# Patient Record
Sex: FEMALE | Race: WHITE | NOT HISPANIC OR LATINO | URBAN - NONMETROPOLITAN AREA
[De-identification: names, ages, dates, MRNs, and addresses within clinical notes are randomized per-mention and may not be internally consistent; named-entity substitution may affect disease eponyms.]

---

## 2018-09-20 PROBLEM — H02.831: Noted: 2018-08-23

## 2018-09-20 PROBLEM — H52.223: Noted: 2018-08-23

## 2018-09-20 PROBLEM — H02.413: Noted: 2018-09-20

## 2018-09-20 PROBLEM — H52.03: Noted: 2018-08-23

## 2018-09-20 PROBLEM — H02.834: Noted: 2018-08-23

## 2019-01-17 ENCOUNTER — IMPORTED ENCOUNTER (OUTPATIENT)
Dept: URBAN - NONMETROPOLITAN AREA CLINIC 1 | Facility: CLINIC | Age: 59
End: 2019-01-17

## 2019-01-17 PROCEDURE — 92285 EXTERNAL OCULAR PHOTOGRAPHY: CPT

## 2019-01-17 NOTE — PATIENT DISCUSSION
Testing Only today. Ptosis/Bleph:-Ptosis (the upper eyelid being in a lower than normal position) of the upper eyelid was explained to the patient. -RBAs of ptosis repair discussed with patient. Treatment options include observation or surgical correction.-Due to affect of dermatochalasis on aesthetic outcome recommend pt have blepharoplasty during ptosis repair. -D/C all blood thinners 3 weeks prior to surgery if safe to do so. -Will order ptosis VF and external photos and review results with patient.-Task sent to Tracey1: 1BLF: 14 -lagTBUT: 13 secondsSchirmers OD:17                    OS:28

## 2019-05-18 ENCOUNTER — IMPORTED ENCOUNTER (OUTPATIENT)
Dept: URBAN - NONMETROPOLITAN AREA CLINIC 1 | Facility: CLINIC | Age: 59
End: 2019-05-18

## 2019-05-18 PROCEDURE — 67904 REPAIR EYELID DEFECT: CPT

## 2019-05-18 NOTE — PATIENT DISCUSSION
Bilateral Ptosis repair with skin excision Procedure done today e/o any complications post op instructions and kit given and reviewed with patient rtc in two weeks for suture removal and POV

## 2019-05-31 ENCOUNTER — IMPORTED ENCOUNTER (OUTPATIENT)
Dept: URBAN - NONMETROPOLITAN AREA CLINIC 1 | Facility: CLINIC | Age: 59
End: 2019-05-31

## 2019-05-31 PROCEDURE — 99024 POSTOP FOLLOW-UP VISIT: CPT

## 2021-09-22 ENCOUNTER — IMPORTED ENCOUNTER (OUTPATIENT)
Dept: URBAN - NONMETROPOLITAN AREA CLINIC 1 | Facility: CLINIC | Age: 61
End: 2021-09-22

## 2021-09-22 PROBLEM — H52.03: Noted: 2021-09-22

## 2021-09-22 PROBLEM — H52.223: Noted: 2021-09-22

## 2021-09-22 PROCEDURE — 92014 COMPRE OPH EXAM EST PT 1/>: CPT

## 2021-09-22 PROCEDURE — 92015 DETERMINE REFRACTIVE STATE: CPT

## 2021-09-22 NOTE — PATIENT DISCUSSION
S/P Bilateral Ptosis repair Hyperopia-Discussed diagnosis with patient. Astigmatism-Discussed diagnosis with patient. Updated spec Rx given. Recommend lens that will provide comfort as well as protect safety and health of eyes.

## 2022-04-09 ASSESSMENT — VISUAL ACUITY
OS_SC: 20/20
OS_CC: 20/25
OD_CC: 20/20
OD_CC: 20/25-
OD_SC: 20/20
OS_CC: 20/25+2

## 2022-04-09 ASSESSMENT — TONOMETRY
OD_IOP_MMHG: 16
OS_IOP_MMHG: 17

## 2024-02-23 ENCOUNTER — COMPREHENSIVE EXAM (OUTPATIENT)
Dept: RURAL CLINIC 1 | Facility: CLINIC | Age: 64
End: 2024-02-23

## 2024-02-23 DIAGNOSIS — H25.9: ICD-10-CM

## 2024-02-23 DIAGNOSIS — Z98.890: ICD-10-CM

## 2024-02-23 DIAGNOSIS — H52.03: ICD-10-CM

## 2024-02-23 DIAGNOSIS — H52.223: ICD-10-CM

## 2024-02-23 PROCEDURE — 92015 DETERMINE REFRACTIVE STATE: CPT

## 2024-02-23 PROCEDURE — 92014 COMPRE OPH EXAM EST PT 1/>: CPT

## 2024-02-23 ASSESSMENT — TONOMETRY
OS_IOP_MMHG: 16
OD_IOP_MMHG: 16

## 2024-02-23 ASSESSMENT — VISUAL ACUITY
OS_CC: 20/25-2
OU_CC: 20/20
OD_CC: 20/30